# Patient Record
Sex: FEMALE | Race: WHITE | NOT HISPANIC OR LATINO
[De-identification: names, ages, dates, MRNs, and addresses within clinical notes are randomized per-mention and may not be internally consistent; named-entity substitution may affect disease eponyms.]

---

## 2022-04-22 ENCOUNTER — APPOINTMENT (OUTPATIENT)
Dept: ORTHOPEDIC SURGERY | Facility: CLINIC | Age: 67
End: 2022-04-22
Payer: MEDICARE

## 2022-04-22 VITALS — HEIGHT: 66 IN | BODY MASS INDEX: 28.61 KG/M2 | WEIGHT: 178 LBS

## 2022-04-22 PROCEDURE — 72040 X-RAY EXAM NECK SPINE 2-3 VW: CPT

## 2022-04-22 PROCEDURE — 99214 OFFICE O/P EST MOD 30 MIN: CPT

## 2022-04-22 RX ORDER — OXYCODONE HYDROCHLORIDE AND ACETAMINOPHEN 5; 325 MG/1; MG/1
5-325 TABLET ORAL
Refills: 0 | Status: ACTIVE | COMMUNITY

## 2022-04-22 RX ORDER — ATENOLOL 100 MG/1
100 TABLET ORAL
Refills: 0 | Status: ACTIVE | COMMUNITY

## 2022-04-22 RX ORDER — ASPIRIN 81 MG
81 TABLET, DELAYED RELEASE (ENTERIC COATED) ORAL
Refills: 0 | Status: ACTIVE | COMMUNITY

## 2022-04-22 RX ORDER — LOSARTAN POTASSIUM 100 MG/1
100 TABLET, FILM COATED ORAL
Refills: 0 | Status: ACTIVE | COMMUNITY

## 2022-04-22 RX ORDER — GABAPENTIN 300 MG/1
300 CAPSULE ORAL
Refills: 0 | Status: ACTIVE | COMMUNITY

## 2022-04-22 RX ORDER — AMLODIPINE BESYLATE 5 MG/1
5 TABLET ORAL
Refills: 0 | Status: ACTIVE | COMMUNITY

## 2022-04-22 RX ORDER — POLYVINYL ALCOHOL, POVIDONE 14; 6 MG/ML; MG/ML
SOLUTION/ DROPS OPHTHALMIC
Refills: 0 | Status: ACTIVE | COMMUNITY

## 2022-04-22 RX ORDER — MECLIZINE HCL 25 MG/1
25 TABLET ORAL
Refills: 0 | Status: ACTIVE | COMMUNITY

## 2022-04-22 RX ORDER — RABEPRAZOLE SODIUM 20 MG/1
20 TABLET, DELAYED RELEASE ORAL
Refills: 0 | Status: ACTIVE | COMMUNITY

## 2022-04-22 RX ORDER — CHOLECALCIFEROL (VITAMIN D3) 50 MCG
50 MCG TABLET ORAL
Refills: 0 | Status: ACTIVE | COMMUNITY

## 2022-04-22 NOTE — DATA REVIEWED
[Outside X-rays] : outside x-rays [Cervical Spine] : cervical spine [Report was reviewed and noted in the chart] : The report was reviewed and noted in the chart [I independently reviewed and interpreted images and report] : I independently reviewed and interpreted images and report [FreeTextEntry1] : I stop paperwork reviewed

## 2022-04-22 NOTE — PHYSICAL EXAM
[Normal Coordination] : normal coordination [Normal DTR UE/LE] : normal DTR UE/LE  [Normal Sensation] : normal sensation [Normal Mood and Affect] : normal mood and affect [Orientated] : orientated [Able to Communicate] : able to communicate [Normal Skin] : normal skin [No Rash] : no rash [No Ulcers] : no ulcers [No Lesions] : no lesions [No obvious lymphadenopathy in areas examined] : no obvious lymphadenopathy in areas examined [Well Developed] : well developed [Well Nourished] : well nourished [Peripheral vascular exam is grossly normal] : peripheral vascular exam is grossly normal [No Respiratory Distress] : no respiratory distress [Lungs clear to auscultation bilaterally] : lungs clear to auscultation bilaterally [NL (80)] : right lateral rotation 80 degrees [Biceps 2+] : biceps 2+ [Triceps 2+] : triceps 2+ [Brachioradialis 2+] : brachioradialis 2+ [NL (90)] : forward flexion 90 degrees [NL (30)] : right lateral rotation 30 degrees [NL (45)] : extension 45 degrees [NL (40)] : right lateral bending 40 degrees [Flexion] : flexion [Extension] : extension [5___] : right extensor hallicus longus 5[unfilled]/5 [] : non-antalgic

## 2022-04-22 NOTE — DISCUSSION/SUMMARY
[Medication Risks Reviewed] : Medication risks reviewed [de-identified] : Reviewed and discussed results of cervical spine x-ray. Patient referred for cervical MRI scan with and without contrast to evaluate persistent neck pain radiating into upper extremity despite medical intervention. Evaluate for disc herniation vs stenosis. DIscussed proper body mechanics and modified physical activity to avoid aggravation of symptoms. Patient will follow up after MRI scan. PAtient will continue home exercise rehabilitation for low back. Continue judicious use prn nsaid. Gabapentin renewed.

## 2022-04-22 NOTE — HISTORY OF PRESENT ILLNESS
[Neck] : neck [Lower back] : lower back [6] : 6 [5] : 5 [Retired] : Work status: retired [de-identified] : Patient states the severity of her pain has reduced since her previous visit. No new complaints. Patient still has intermittent moments of discomfort. Patient also complains of neck pain that radiates into upper extremities.  [de-identified] : p/t

## 2022-05-11 ENCOUNTER — RESULT REVIEW (OUTPATIENT)
Age: 67
End: 2022-05-11

## 2022-05-25 ENCOUNTER — APPOINTMENT (OUTPATIENT)
Dept: ORTHOPEDIC SURGERY | Facility: CLINIC | Age: 67
End: 2022-05-25
Payer: MEDICARE

## 2022-06-03 ENCOUNTER — APPOINTMENT (OUTPATIENT)
Dept: ORTHOPEDIC SURGERY | Facility: CLINIC | Age: 67
End: 2022-06-03
Payer: MEDICARE

## 2022-06-03 VITALS — HEIGHT: 66 IN | WEIGHT: 178 LBS | BODY MASS INDEX: 28.61 KG/M2

## 2022-06-03 DIAGNOSIS — Z87.19 PERSONAL HISTORY OF OTHER DISEASES OF THE DIGESTIVE SYSTEM: ICD-10-CM

## 2022-06-03 DIAGNOSIS — Z56.0 UNEMPLOYMENT, UNSPECIFIED: ICD-10-CM

## 2022-06-03 DIAGNOSIS — E78.00 PURE HYPERCHOLESTEROLEMIA, UNSPECIFIED: ICD-10-CM

## 2022-06-03 DIAGNOSIS — Z87.39 PERSONAL HISTORY OF OTHER DISEASES OF THE MUSCULOSKELETAL SYSTEM AND CONNECTIVE TISSUE: ICD-10-CM

## 2022-06-03 DIAGNOSIS — I10 ESSENTIAL (PRIMARY) HYPERTENSION: ICD-10-CM

## 2022-06-03 DIAGNOSIS — Z78.9 OTHER SPECIFIED HEALTH STATUS: ICD-10-CM

## 2022-06-03 PROCEDURE — 72100 X-RAY EXAM L-S SPINE 2/3 VWS: CPT

## 2022-06-03 PROCEDURE — 99214 OFFICE O/P EST MOD 30 MIN: CPT

## 2022-06-03 SDOH — ECONOMIC STABILITY - INCOME SECURITY: UNEMPLOYMENT, UNSPECIFIED: Z56.0

## 2022-06-07 PROBLEM — Z56.0 NOT CURRENTLY WORKING DUE TO DISABLED STATUS: Status: ACTIVE | Noted: 2022-06-03

## 2022-06-07 PROBLEM — Z87.39 HISTORY OF RHEUMATOID ARTHRITIS: Status: RESOLVED | Noted: 2022-06-03 | Resolved: 2022-06-07

## 2022-06-07 PROBLEM — Z87.19 HISTORY OF GASTROESOPHAGEAL REFLUX (GERD): Status: RESOLVED | Noted: 2022-06-03 | Resolved: 2022-06-07

## 2022-06-07 NOTE — DATA REVIEWED
[MRI] : MRI [Cervical Spine] : cervical spine [Report was reviewed and noted in the chart] : The report was reviewed and noted in the chart [Lumbar Spine] : lumbar spine [I independently reviewed and interpreted images and report] : I independently reviewed and interpreted images and report [I reviewed the films/CD and additionally noted] : I reviewed the films/CD and additionally noted [FreeTextEntry2] : In office xrays taken today demonstrate good maintenance of alignment and implant placement, progress to fusion. [FreeTextEntry1] : I stop paperwork reviewed

## 2022-06-07 NOTE — HISTORY OF PRESENT ILLNESS
[de-identified] : Patient states the severity of her pain has reduced since her previous visit. No new complaints. Patient states she still has neck stiffness with intermittent headaches. No changes in upper extremity strength b/l. Treatment with occipital blocks in the past for previous complaints did not provide relief. No numbness/tingling sensation to upper extremities b/l. No pain radiating into upper extremities b/l. Patient has continued with at home exercises/stretches as best tolerated. Patient also complains of SI joint pain. Patient has continued with walking as best tolerated. Patient states her lower back pain intermittently radiates into RT lower extremity. No changes in lower extremity strength b/l. No numbness/tingling sensation to lower extremities b/l.

## 2022-06-07 NOTE — PHYSICAL EXAM
[Normal Coordination] : normal coordination [Normal DTR UE/LE] : normal DTR UE/LE  [Normal Sensation] : normal sensation [Normal Mood and Affect] : normal mood and affect [Orientated] : orientated [Able to Communicate] : able to communicate [Normal Skin] : normal skin [No Rash] : no rash [No Ulcers] : no ulcers [No Lesions] : no lesions [No obvious lymphadenopathy in areas examined] : no obvious lymphadenopathy in areas examined [Well Developed] : well developed [Well Nourished] : well nourished [Peripheral vascular exam is grossly normal] : peripheral vascular exam is grossly normal [No Respiratory Distress] : no respiratory distress [Lungs clear to auscultation bilaterally] : lungs clear to auscultation bilaterally [NL (80)] : right lateral rotation 80 degrees [Biceps 2+] : biceps 2+ [Triceps 2+] : triceps 2+ [Brachioradialis 2+] : brachioradialis 2+ [NL (90)] : forward flexion 90 degrees [NL (30)] : right lateral rotation 30 degrees [NL (45)] : extension 45 degrees [NL (40)] : right lateral bending 40 degrees [5___] : right extensor hallicus longus 5[unfilled]/5 [Implants in position] : Implants in position [No loosening of hardware] : No loosening of hardware [] : non-antalgic [FreeTextEntry1] : Good maintenance of alignment and implant placement. Good progress towards fusion.

## 2022-06-07 NOTE — REASON FOR VISIT
[FreeTextEntry2] : [facility] Cervical Spine\par Low back pain, right side

## 2022-06-07 NOTE — DISCUSSION/SUMMARY
[Medication Risks Reviewed] : Medication risks reviewed [de-identified] : Reviewed and discussed results of cervical spine MRI scan. Discussed proper body mechanics and modified physical activity to avoid aggravation of symptoms. Patient will continue with at home exercises/stretches as best tolerated. Discussed limitations of benefits with discectomy surgery. Discussed further treatment with acupuncture therapy. Patient will use OTC NSAIDs as needed for symptom relief. Patient will follow up in 2 months. Reviewed and discussed results of lumbar spine x-ray. Patient will begin treatment with formal physical therapy for neck and piriformis muscle stretching.

## 2022-09-14 ENCOUNTER — APPOINTMENT (OUTPATIENT)
Dept: ORTHOPEDIC SURGERY | Facility: CLINIC | Age: 67
End: 2022-09-14

## 2022-09-14 VITALS — HEIGHT: 66 IN | WEIGHT: 178 LBS | BODY MASS INDEX: 28.61 KG/M2

## 2022-09-14 PROCEDURE — 72100 X-RAY EXAM L-S SPINE 2/3 VWS: CPT

## 2022-09-14 PROCEDURE — 99214 OFFICE O/P EST MOD 30 MIN: CPT

## 2022-09-14 RX ORDER — GABAPENTIN 300 MG/1
300 CAPSULE ORAL TWICE DAILY
Qty: 60 | Refills: 2 | Status: ACTIVE | COMMUNITY
Start: 2022-09-14 | End: 1900-01-01

## 2022-09-15 NOTE — PHYSICAL EXAM
[Normal Coordination] : normal coordination [Normal DTR UE/LE] : normal DTR UE/LE  [Normal Sensation] : normal sensation [Normal Mood and Affect] : normal mood and affect [Orientated] : orientated [Able to Communicate] : able to communicate [Normal Skin] : normal skin [No Rash] : no rash [No Ulcers] : no ulcers [No Lesions] : no lesions [No obvious lymphadenopathy in areas examined] : no obvious lymphadenopathy in areas examined [Well Developed] : well developed [Well Nourished] : well nourished [Peripheral vascular exam is grossly normal] : peripheral vascular exam is grossly normal [No Respiratory Distress] : no respiratory distress [Lungs clear to auscultation bilaterally] : lungs clear to auscultation bilaterally [NL (90)] : forward flexion 90 degrees [NL (30)] : right lateral rotation 30 degrees [NL (45)] : extension 45 degrees [NL (40)] : right lateral bending 40 degrees [Flexion] : flexion [Extension] : extension [5___] : right extensor hallicus longus 5[unfilled]/5 [Implants in position] : Implants in position [No loosening of hardware] : No loosening of hardware [] : non-antalgic [FreeTextEntry1] : Good maintenance of alignment and implant placement. Apparent solid fusion L4-S1

## 2022-09-15 NOTE — HISTORY OF PRESENT ILLNESS
[de-identified] : 66 y/o female presents for a f/u of lumbar. C/o Rt sided low back pain radiating into the buttock and lower extremity. Reports right leg spasms. Denies left sided symptoms. She is taking gabapentin as prescribed, which she finds helpful. Improvement of her symptoms from PT - she is in PT for her RT hip. No changes in lower extremity strength b/l. No numbness/tingling sensation to lower extremities b/l. Exacerbation of back pain with repetitive bending. \par \par \par \par

## 2022-09-15 NOTE — DISCUSSION/SUMMARY
[Medication Risks Reviewed] : Medication risks reviewed [de-identified] : Discussed and reviewed results of lumbar x-ray - implants in position, no loosening of hardware, apparent solid fusion L4-S1. Discussed conservative treatments in the form of anti-inflammatories, core strengthening exercises, and PT. Incorporate lumbar and lower extremity exercises / stretches into formal PT - Script given. Patient given prescription for gabapentin. Titration schedule provided. Advised patient on proper body mechanics to avoid aggravation of her symptoms. Follow up in 3 months. If leg symptoms persist consider mri to r/o adjacent segment stenosis.\par \stephany SORTO Acting as a Scribe for Adela Love, attest that this documentation has been prepared under the direction and in the presence of Provider Arturo Ibarra MD.

## 2022-10-11 ENCOUNTER — APPOINTMENT (OUTPATIENT)
Dept: ORTHOPEDIC SURGERY | Facility: CLINIC | Age: 67
End: 2022-10-11
Payer: MEDICARE

## 2022-10-11 VITALS — BODY MASS INDEX: 28.61 KG/M2 | WEIGHT: 178 LBS | HEIGHT: 66 IN

## 2022-10-11 DIAGNOSIS — G57.31 LESION OF LATERAL POPLITEAL NERVE, RIGHT LOWER LIMB: ICD-10-CM

## 2022-10-11 PROCEDURE — 99204 OFFICE O/P NEW MOD 45 MIN: CPT

## 2022-10-11 PROCEDURE — 99214 OFFICE O/P EST MOD 30 MIN: CPT

## 2022-10-11 PROCEDURE — 95864 NEEDLE EMG 4 EXTREMITIES: CPT

## 2022-10-11 PROCEDURE — 73562 X-RAY EXAM OF KNEE 3: CPT | Mod: RT

## 2022-10-11 NOTE — REASON FOR VISIT
[FreeTextEntry2] : here today for f/u right knee TKA 2008.  states she is going to PT for hip/back and is getting popping, pain and swelling in right knee.

## 2022-10-11 NOTE — DISCUSSION/SUMMARY
[de-identified] : patient advised to participate in physical therapy and home exercises\par \par patient sent for an EMG to evaluate peroneal neuropathy \par

## 2022-10-11 NOTE — PHYSICAL EXAM
[5___] : hamstring 5[unfilled]/5 [Right] : right knee [AP] : anteroposterior [Lateral] : lateral [Lyle] : skyline [There are no fractures, subluxations or dislocations. No significant abnormalities are seen] : There are no fractures, subluxations or dislocations. No significant abnormalities are seen [Components well fixed, in good position] : Components well fixed, in good position [] : no deformities of patellar tendon [FreeTextEntry8] : Biceps Femoris to fiula head  [FreeTextEntry9] : Can push to extension  [de-identified] : Decreased sensation Peroneal Nerve  [TWNoteComboBox7] : flexion 90 degrees [de-identified] : extension 5 degrees

## 2022-11-22 ENCOUNTER — APPOINTMENT (OUTPATIENT)
Dept: ORTHOPEDIC SURGERY | Facility: CLINIC | Age: 67
End: 2022-11-22

## 2022-12-14 ENCOUNTER — APPOINTMENT (OUTPATIENT)
Dept: ORTHOPEDIC SURGERY | Facility: CLINIC | Age: 67
End: 2022-12-14

## 2022-12-14 VITALS — HEIGHT: 66 IN | BODY MASS INDEX: 28.61 KG/M2 | WEIGHT: 178 LBS

## 2022-12-14 PROCEDURE — 99214 OFFICE O/P EST MOD 30 MIN: CPT

## 2022-12-14 PROCEDURE — 72100 X-RAY EXAM L-S SPINE 2/3 VWS: CPT

## 2022-12-14 RX ORDER — GABAPENTIN 300 MG/1
300 CAPSULE ORAL TWICE DAILY
Qty: 60 | Refills: 2 | Status: ACTIVE | COMMUNITY
Start: 2022-12-14 | End: 1900-01-01

## 2022-12-15 NOTE — HISTORY OF PRESENT ILLNESS
[de-identified] : 12/14/2022 - The patient is a 67 year female  who presents today follow up low back, left side pain in buttocks and thigh. in the lower buttocks to lower extremities in the left leg. When bending, back starts locking up. 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 Patient reports that surgery improved pain. SI joint is still in pain, mainly right side.Taking Gabapentin twice a day. Hospitalized a month ago, CT scan, blockage in the small intense, states she is doing better. \par \par Date of Injury/Onset: few days after picking up a bin.\par Pain:   At Rest: 9/10 \par With Activity:  7/10 \par Taking gabapentin at night\par \par 09/14/2022 - 66 y/o female presents for a f/u of lumbar. C/o Rt sided low back pain radiating into the buttock and lower extremity. Reports right leg spasms. Denies left sided symptoms. She is taking gabapentin as prescribed, which she finds helpful. Improvement of her symptoms from PT - she is in PT for her RT hip. No changes in lower extremity strength b/l. No numbness/tingling sensation to lower extremities b/l. Exacerbation of back pain with repetitive bending. \par \par \par \par

## 2022-12-15 NOTE — DATA REVIEWED
[Lumbar Spine] : lumbar spine [I independently reviewed and interpreted images and report] : I independently reviewed and interpreted images and report [I reviewed the films/CD and additionally noted] : I reviewed the films/CD and additionally noted [FreeTextEntry1] : I stop paperwork reviewed\par Rheumatology progress notes reviewed

## 2022-12-15 NOTE — DISCUSSION/SUMMARY
[Medication Risks Reviewed] : Medication risks reviewed [de-identified] : Discussed and reviewed results of lumbar x-ray - implants in position, no loosening of hardware, apparent solid fusion L4-S1. Discussed conservative treatments in the form of anti-inflammatories, core strengthening exercises, and Gabapentin. Incorporate lumbar and lower extremity exercises / stretches into routine. Patient given renewal for gabapentin. Titration schedule provided. Advised patient on proper body mechanics to avoid aggravation of her symptoms. If leg symptoms persist consider mri to r/o adjacent segment stenosis. Follow up in 2-3 months. \par \stephany SORTO Acting as a Scribe for Adela Love, attest that this documentation has been prepared under the direction and in the presence of Provider Arturo Ibarra MD.

## 2023-04-05 ENCOUNTER — APPOINTMENT (OUTPATIENT)
Dept: ORTHOPEDIC SURGERY | Facility: CLINIC | Age: 68
End: 2023-04-05
Payer: MEDICARE

## 2023-04-05 VITALS — WEIGHT: 178 LBS | HEIGHT: 66 IN | BODY MASS INDEX: 28.61 KG/M2

## 2023-04-05 PROCEDURE — 99214 OFFICE O/P EST MOD 30 MIN: CPT

## 2023-04-05 PROCEDURE — 72040 X-RAY EXAM NECK SPINE 2-3 VW: CPT

## 2023-04-05 PROCEDURE — 72100 X-RAY EXAM L-S SPINE 2/3 VWS: CPT

## 2023-04-05 RX ORDER — GABAPENTIN 300 MG/1
300 CAPSULE ORAL 3 TIMES DAILY
Qty: 90 | Refills: 2 | Status: ACTIVE | COMMUNITY
Start: 2023-04-05 | End: 1900-01-01

## 2023-04-11 NOTE — HISTORY OF PRESENT ILLNESS
[de-identified] : 4/5/23- 66 y/o female presenting with lower back pain with radicular sxs into the right buttock. RT hand procedure 3/10, she is recovering well and completing PT for the hand. Difficulty with gait due to RT sided focal low back pain radiating into the buttock. \par Pain at rest:5/10\par Pain with activity: 8/10\par Treatment/changes/ images since last visit: pt reports sxs improved since last visit and flared up again about 1 month ago, has been taking gabapentin\par *Cervical - C/o left sided neck pain radiating into the left upper arm (severity improved since initial onset after treating with heat and TENS unit). Weakness in arm due to pain vs neurologic deficit. \par \par 12/14/2022 - The patient is a 67 year female  who presents today follow up low back, left side pain in buttocks and thigh. in the lower buttocks to lower extremities in the left leg. When bending, back starts locking up. 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 Patient reports that surgery improved pain. SI joint is still in pain, mainly right side.Taking Gabapentin twice a day. Hospitalized a month ago, CT scan, blockage in the small intense, states she is doing better. \par \par Date of Injury/Onset: few days after picking up a bin.\par Pain:   At Rest: 9/10 \par With Activity:  7/10 \par Taking gabapentin at night\par \par 09/14/2022 - 66 y/o female presents for a f/u of lumbar. C/o Rt sided low back pain radiating into the buttock and lower extremity. Reports right leg spasms. Denies left sided symptoms. She is taking gabapentin as prescribed, which she finds helpful. Improvement of her symptoms from PT - she is in PT for her RT hip. No changes in lower extremity strength b/l. No numbness/tingling sensation to lower extremities b/l. Exacerbation of back pain with repetitive bending. \par \par \par \par

## 2023-04-11 NOTE — DISCUSSION/SUMMARY
[Medication Risks Reviewed] : Medication risks reviewed [de-identified] : Discussed and reviewed results of lumbar x-ray - implants in position, no loosening of hardware, apparent solid fusion L4-S1. Discussed conservative treatments in the form of anti-inflammatories, core strengthening exercises, and Gabapentin. Incorporate lumbar and lower extremity exercises / stretches into routine. C/t treatment with gabapentin as prescribed. Advised patient on proper body mechanics to avoid aggravation of her symptoms. If leg symptoms persist consider mri to r/o adjacent segment stenosis. Patient provided with Pt script according to protocol. Follow up in 2-3 months. \par \par Prior to appointment and during encounter with patient extensive medical records were reviewed including but not limited to, hospital records, outpatient records, imaging results, and lab data.During this appointment the patient was examined, diagnoses were discussed and explained in a face to face manner. In addition extensive time was spent reviewing aforementioned diagnostic studies. Counseling including abnormal image results, differential diagnoses, treatment options, risk and benefits, lifestyle changes, current condition, and current medications was performed. Patient's comments, questions, and concerns were addressed and patient verbalized understanding. Based on this patient's presentation at our office, which is an orthopedic spine surgeon's office, this patient inherently / intrinsically has a risk, however minute, of developing issues such as Cauda equina syndrome, bowel and bladder changes, or progression of motor or neurological deficits such as paralysis which may be permanent. \par \par ADELA SORTO Acting as a Scribe for Adela Love, attest that this documentation has been prepared under the direction and in the presence of Provider Arturo Ibarra MD.

## 2023-07-12 ENCOUNTER — APPOINTMENT (OUTPATIENT)
Dept: ORTHOPEDIC SURGERY | Facility: CLINIC | Age: 68
End: 2023-07-12
Payer: MEDICARE

## 2023-07-12 VITALS — BODY MASS INDEX: 28.61 KG/M2 | WEIGHT: 178 LBS | HEIGHT: 66 IN

## 2023-07-12 PROCEDURE — 99213 OFFICE O/P EST LOW 20 MIN: CPT

## 2023-07-31 ENCOUNTER — RESULT REVIEW (OUTPATIENT)
Age: 68
End: 2023-07-31

## 2023-08-02 ENCOUNTER — APPOINTMENT (OUTPATIENT)
Dept: ORTHOPEDIC SURGERY | Facility: CLINIC | Age: 68
End: 2023-08-02
Payer: MEDICARE

## 2023-08-02 VITALS — WEIGHT: 178 LBS | BODY MASS INDEX: 28.61 KG/M2 | HEIGHT: 66 IN

## 2023-08-02 PROCEDURE — 99213 OFFICE O/P EST LOW 20 MIN: CPT

## 2023-08-06 NOTE — HISTORY OF PRESENT ILLNESS
[de-identified] : 08/02/2023: Patient presenting for an MRI Review of Cervical Spine. Severeity of pain reduced over the last week. Chief complaint of left sided neck pain and intermittent radiculopathy.   07/12/2023 - 67 y/o F presenting for an evaluation of cervical spine. Chief complaint of left sided neck pain and radiculopathy, subjective weakness in the left arm (chronic issue). Secondary complaints of right sided neck pain. Last C spine MRI May 2022. Pain is worse compared to 1 year ago. Pain is breaking through Advil and Gabapentin. Completing sessions of physical therapy for cervical spine without any change in symptoms, d/c. Using ice and heat for symptom control. PAin is severe at nighttime with certain positional movements.

## 2023-08-06 NOTE — DISCUSSION/SUMMARY
[de-identified] : MRI reveals no sgincant nerve compression. No spinal cord compression. Degenerative changes throughout.   I advised the patient that I do not anticipate the findings on MRI to result in surgery.  There is limitation of treatment with nsaids due to GI upset.  Patient was provided with a referral for cervical physical therapy to work on stretching, strengthening and range of motion. FUV 6 WKS  Prior to appointment and during encounter with patient extensive medical records were reviewed including but not limited to, hospital records, outpatient records, imaging results, and lab data.During this appointment the patient was examined, diagnoses were discussed and explained in a face to face manner. In addition extensive time was spent reviewing aforementioned diagnostic studies. Counseling including abnormal image results, differential diagnoses, treatment options, risk and benefits, lifestyle changes, current condition, and current medications was performed. Patient's comments, questions, and concerns were addressed and patient verbalized understanding. Based on this patient's presentation at our office, which is an orthopedic spine surgeon's office, this patient inherently / intrinsically has a risk, however minute, of developing issues such as Cauda equina syndrome, bowel and bladder changes, or progression of motor or neurological deficits such as paralysis which may be permanent.  ADELA SIMON Acting as a Scribe for Dr. Fred BACK, Adela Simon, attest that this documentation has been prepared under the direction and in the presence of Provider Arturo Ibarra MD.

## 2023-08-06 NOTE — PHYSICAL EXAM
[Normal Coordination] : normal coordination [Normal DTR UE/LE] : normal DTR UE/LE  [Normal Sensation] : normal sensation [Normal Mood and Affect] : normal mood and affect [Orientated] : orientated [Able to Communicate] : able to communicate [Normal Skin] : normal skin [No Rash] : no rash [No Ulcers] : no ulcers [No Lesions] : no lesions [No obvious lymphadenopathy in areas examined] : no obvious lymphadenopathy in areas examined [Well Developed] : well developed [Well Nourished] : well nourished [Peripheral vascular exam is grossly normal] : peripheral vascular exam is grossly normal [No Respiratory Distress] : no respiratory distress [Lungs clear to auscultation bilaterally] : lungs clear to auscultation bilaterally [Normal Bowel Sounds] : normal bowel sounds [Non-Tender] : non-tender [No HSM] : no HSM [No Mass] : no mass [Biceps 2+] : biceps 2+ [Triceps 2+] : triceps 2+ [Brachioradialis 2+] : brachioradialis 2+ [] : negative Pritchett reflex

## 2023-08-20 NOTE — DISCUSSION/SUMMARY
[de-identified] : I am requesting a cervical spine MRI to evaluate for stenosis vs hnp, patient experiencing persistent left sided neck and upper extremity radic refrac to Advil, Gabapentin, ice, heat, physical therapy, last MRI study is approx 14 months old. She may be a candidate for interventional pain management in terms of epidural steroid injections although this will be determined upon review of the MRI. F/U after the study is complete. \par \par Prior to appointment and during encounter with patient extensive medical records were reviewed including but not limited to, hospital records, outpatient records, imaging results, and lab data.During this appointment the patient was examined, diagnoses were discussed and explained in a face to face manner. In addition extensive time was spent reviewing aforementioned diagnostic studies. Counseling including abnormal image results, differential diagnoses, treatment options, risk and benefits, lifestyle changes, current condition, and current medications was performed. Patient's comments, questions, and concerns were addressed and patient verbalized understanding. Based on this patient's presentation at our office, which is an orthopedic spine surgeon's office, this patient inherently / intrinsically has a risk, however minute, of developing issues such as Cauda equina syndrome, bowel and bladder changes, or progression of motor or neurological deficits such as paralysis which may be permanent.\par \par ADELA SIMON Acting as a Scribe for Dr. Ibarra\stephany I, Adela Simon, attest that this documentation has been prepared under the direction and in the presence of Provider Arturo Ibarra MD. \par \par

## 2023-08-20 NOTE — HISTORY OF PRESENT ILLNESS
[de-identified] : 07/12/2023 - 69 y/o F presenting for an evaluation of cervical spine. Chief complaint of left sided neck pain and radiculopathy, subjective weakness in the left arm (chronic issue). Secondary complaints of right sided neck pain. Last C spine MRI May 2022. Pain is worse compared to 1 year ago. Pain is breaking through Advil and Gabapentin. Completing sessions of physical therapy for cervical spine without any change in symptoms, d/c. Using ice and heat for symptom control. PAin is severe at nighttime with certain positional movements. \par \par \par \par \par

## 2023-09-08 ENCOUNTER — APPOINTMENT (OUTPATIENT)
Dept: ORTHOPEDIC SURGERY | Facility: CLINIC | Age: 68
End: 2023-09-08
Payer: MEDICARE

## 2023-09-08 VITALS — BODY MASS INDEX: 28.61 KG/M2 | WEIGHT: 178 LBS | HEIGHT: 66 IN

## 2023-09-08 PROCEDURE — 99214 OFFICE O/P EST MOD 30 MIN: CPT

## 2023-09-08 PROCEDURE — 72100 X-RAY EXAM L-S SPINE 2/3 VWS: CPT

## 2023-09-08 RX ORDER — METHYLPREDNISOLONE 4 MG/1
4 TABLET ORAL
Qty: 1 | Refills: 0 | Status: ACTIVE | COMMUNITY
Start: 2023-09-08 | End: 1900-01-01

## 2023-09-10 NOTE — HISTORY OF PRESENT ILLNESS
[9] : 9 [6] : 6 [Retired] : Work status: retired [de-identified] : 09/08/2023 - Patient presenting for an evulation of neck and low back. She complains of severe back pain radiating into the left buttock/hip, extending into the left quad x 2 weeks without trauma or mechanism of injury. She has been treating with heat, ice , Tylenol, topicals - pain is breaking through. States side effects from muscle relaxers. She is taking gabapentin which mildly reduces the severity of her pain. Neck pain has been stable. She is in physical therapy for neck only.   [de-identified] : p/t- neck only [FreeTextEntry5] : lower back pain started about 2 weeks ago & pain is sever with walking, getting out of bed & movement. neck pain is a little better with p/t

## 2023-09-10 NOTE — DISCUSSION/SUMMARY
[de-identified] : XRAY taken today of lumbar spine today is normal.  I advised the patient to continue her current PT trial directed at cervical, and to begin implementing the exercises they're teaching her into a daily routine.  I am prescribing the patient MDP for pain relief. Titration schedule provided.  She will keep her holding appointment scheduled 9/27/23.   Prior to appointment and during encounter with patient extensive medical records were reviewed including but not limited to, hospital records, outpatient records, imaging results, and lab data.During this appointment the patient was examined, diagnoses were discussed and explained in a face to face manner. In addition extensive time was spent reviewing aforementioned diagnostic studies. Counseling including abnormal image results, differential diagnoses, treatment options, risk and benefits, lifestyle changes, current condition, and current medications was performed. Patient's comments, questions, and concerns were addressed and patient verbalized understanding. Based on this patient's presentation at our office, which is an orthopedic spine surgeon's office, this patient inherently / intrinsically has a risk, however minute, of developing issues such as Cauda equina syndrome, bowel and bladder changes, or progression of motor or neurological deficits such as paralysis which may be permanent.  ADELA SORTO Acting as a Scribe for Adela Lizarraga, attest that this documentation has been prepared under the direction and in the presence of Provider Arturo Ibarra MD.   ADELA SORTO Acting as a Scribe for Adela Lizarraga, attest that this documentation has been prepared under the direction and in the presence of Provider Arturo Ibarra MD.

## 2023-09-10 NOTE — PHYSICAL EXAM
[Normal Coordination] : normal coordination [Normal DTR UE/LE] : normal DTR UE/LE  [Normal Sensation] : normal sensation [Normal Mood and Affect] : normal mood and affect [Orientated] : orientated [Able to Communicate] : able to communicate [Normal Skin] : normal skin [No Rash] : no rash [No Ulcers] : no ulcers [No Lesions] : no lesions [No obvious lymphadenopathy in areas examined] : no obvious lymphadenopathy in areas examined [Well Developed] : well developed [Peripheral vascular exam is grossly normal] : peripheral vascular exam is grossly normal [No Respiratory Distress] : no respiratory distress [Lungs clear to auscultation bilaterally] : lungs clear to auscultation bilaterally [Normal Bowel Sounds] : normal bowel sounds [Non-Tender] : non-tender [No HSM] : no HSM [No Mass] : no mass [Biceps 2+] : biceps 2+ [Triceps 2+] : triceps 2+ [Brachioradialis 2+] : brachioradialis 2+ [Flexion] : flexion [Extension] : extension [] : negative Pritchett reflex

## 2023-09-10 NOTE — DATA REVIEWED
[I independently reviewed and interpreted images and report] : I independently reviewed and interpreted images and report [I reviewed the films/CD and additionally noted] : I reviewed the films/CD and additionally noted [FreeTextEntry1] : I stop paperwork reviewed PT progress notes reviewed

## 2023-09-27 ENCOUNTER — APPOINTMENT (OUTPATIENT)
Dept: ORTHOPEDIC SURGERY | Facility: CLINIC | Age: 68
End: 2023-09-27
Payer: MEDICARE

## 2023-09-27 VITALS — WEIGHT: 178 LBS | BODY MASS INDEX: 28.61 KG/M2 | HEIGHT: 66 IN

## 2023-09-27 PROCEDURE — 99214 OFFICE O/P EST MOD 30 MIN: CPT | Mod: 25

## 2023-09-27 PROCEDURE — 20552 NJX 1/MLT TRIGGER POINT 1/2: CPT

## 2023-10-31 ENCOUNTER — APPOINTMENT (OUTPATIENT)
Dept: ORTHOPEDIC SURGERY | Facility: CLINIC | Age: 68
End: 2023-10-31
Payer: MEDICARE

## 2023-10-31 VITALS — BODY MASS INDEX: 28.61 KG/M2 | HEIGHT: 66 IN | WEIGHT: 178 LBS

## 2023-10-31 DIAGNOSIS — Z98.890 OTHER SPECIFIED POSTPROCEDURAL STATES: ICD-10-CM

## 2023-10-31 DIAGNOSIS — M76.52 PATELLAR TENDINITIS, LEFT KNEE: ICD-10-CM

## 2023-10-31 PROCEDURE — 99213 OFFICE O/P EST LOW 20 MIN: CPT

## 2023-10-31 PROCEDURE — 73562 X-RAY EXAM OF KNEE 3: CPT | Mod: FY,LT

## 2023-10-31 RX ORDER — MELOXICAM 15 MG/1
15 TABLET ORAL
Qty: 14 | Refills: 0 | Status: ACTIVE | COMMUNITY
Start: 2023-10-31 | End: 1900-01-01

## 2023-11-15 ENCOUNTER — APPOINTMENT (OUTPATIENT)
Dept: ORTHOPEDIC SURGERY | Facility: CLINIC | Age: 68
End: 2023-11-15

## 2023-12-06 ENCOUNTER — APPOINTMENT (OUTPATIENT)
Dept: ORTHOPEDIC SURGERY | Facility: CLINIC | Age: 68
End: 2023-12-06
Payer: MEDICARE

## 2023-12-06 VITALS — HEIGHT: 66 IN | WEIGHT: 178 LBS | BODY MASS INDEX: 28.61 KG/M2

## 2023-12-06 DIAGNOSIS — S90.31XA CONTUSION OF RIGHT FOOT, INITIAL ENCOUNTER: ICD-10-CM

## 2023-12-06 PROCEDURE — 73620 X-RAY EXAM OF FOOT: CPT | Mod: RT

## 2023-12-06 PROCEDURE — 99214 OFFICE O/P EST MOD 30 MIN: CPT

## 2023-12-06 RX ORDER — GABAPENTIN 300 MG/1
300 CAPSULE ORAL TWICE DAILY
Qty: 60 | Refills: 2 | Status: ACTIVE | COMMUNITY
Start: 2023-12-06 | End: 1900-01-01

## 2024-01-31 ENCOUNTER — APPOINTMENT (OUTPATIENT)
Dept: ORTHOPEDIC SURGERY | Facility: CLINIC | Age: 69
End: 2024-01-31
Payer: MEDICARE

## 2024-01-31 VITALS — WEIGHT: 178 LBS | BODY MASS INDEX: 28.61 KG/M2 | HEIGHT: 66 IN

## 2024-01-31 DIAGNOSIS — M54.12 RADICULOPATHY, CERVICAL REGION: ICD-10-CM

## 2024-01-31 PROCEDURE — 99214 OFFICE O/P EST MOD 30 MIN: CPT

## 2024-01-31 RX ORDER — GABAPENTIN 300 MG/1
300 CAPSULE ORAL 3 TIMES DAILY
Qty: 90 | Refills: 3 | Status: ACTIVE | COMMUNITY
Start: 2024-01-31 | End: 1900-01-01

## 2024-02-04 NOTE — HISTORY OF PRESENT ILLNESS
[9] : 9 [6] : 6 [Retired] : Work status: retired [de-identified] : 01/31/2024 - The patient is here for a follow-up, reporting no significant changes since the last visit. She continues to experience low back aching, which intensifies at nighttime and during sleep. To manage the pain radiating into her leg, she is currently undergoing treatment with Gabapentin three times a day. Currently not enrolled in PT.   12/06/2023 - Patient presenting in follow up. She reports dropping a brick on her right foot 3 days ago. She complains of right foot pain and difficulty walking as a result. She also reports aggravated pain radiating into her b/l legs lately, worse with repetitive bending. She is currently enrolled in PT twice per week which is helpful.   09/27/2023 - Patient presenting for a follow up with chief complaint of left sided low back pain radiating into left buttock/pelvic region. No radiating leg pain. Treating with muscle relaxer and Advil without any real relief. Enrolled in PT for neck which was helpful, would like to transition into PT for low back.   09/08/2023 - Patient presenting for an evulation of neck and low back. She complains of severe back pain radiating into the left buttock/hip, extending into the left quad x 2 weeks without trauma or mechanism of injury. She has been treating with heat, ice , Tylenol, topicals - pain is breaking through. States side effects from muscle relaxers. She is taking gabapentin which mildly reduces the severity of her pain. Neck pain has been stable. She is in physical therapy for neck only.   [FreeTextEntry5] : lower back pain started about 2 weeks ago & pain is sever with walking, getting out of bed & movement. neck pain is a little better with p/t [de-identified] : p/t- neck only

## 2024-02-04 NOTE — DISCUSSION/SUMMARY
[de-identified] : Patient was advised to work on stretching, strengthening and range of motion. Discussed continued medical mgmt with Gabapentin as prescribed (renewed). Follow up in 2-3 months.   We have discussed the risks, benefits, and alternatives NSAID therapy including but not limited to the risk of bleeding, thrombosis, gastric mucosal irritation/ulceration, allergic reaction and kidney dysfunction; the patient verbalizes an understanding.   Prior to appointment and during encounter with patient extensive medical records were reviewed including but not limited to, hospital records, outpatient records, imaging results, and lab data.During this appointment the patient was examined, diagnoses were discussed and explained in a face to face manner. In addition extensive time was spent reviewing aforementioned diagnostic studies. Counseling including abnormal image results, differential diagnoses, treatment options, risk and benefits, lifestyle changes, current condition, and current medications was performed. Patient's comments, questions, and concerns were addressed and patient verbalized understanding. Based on this patient's presentation at our office, which is an orthopedic spine surgeon's office, this patient inherently / intrinsically has a risk, however minute, of developing issues such as Cauda equina syndrome, bowel and bladder changes, or progression of motor or neurological deficits such as paralysis which may be permanent.  ADELA SIMON Acting as a Scribe for Dr. Fred BACK, Adela Simon, attest that this documentation has been prepared under the direction and in the presence of Provider Arturo Ibarra MD.

## 2024-02-04 NOTE — DATA REVIEWED
[Outside X-rays] : outside x-rays [Lumbar Spine] : lumbar spine [I independently reviewed and interpreted images and report] : I independently reviewed and interpreted images and report [FreeTextEntry1] : I stop paperwork reviewe

## 2024-02-16 ENCOUNTER — APPOINTMENT (OUTPATIENT)
Dept: ORTHOPEDIC SURGERY | Facility: CLINIC | Age: 69
End: 2024-02-16
Payer: MEDICARE

## 2024-02-16 VITALS — WEIGHT: 179 LBS | HEIGHT: 66 IN | BODY MASS INDEX: 28.77 KG/M2

## 2024-02-16 DIAGNOSIS — M62.830 MUSCLE SPASM OF BACK: ICD-10-CM

## 2024-02-16 PROCEDURE — 99214 OFFICE O/P EST MOD 30 MIN: CPT

## 2024-02-16 RX ORDER — TRAMADOL HYDROCHLORIDE 50 MG/1
50 TABLET, COATED ORAL EVERY 6 HOURS
Qty: 60 | Refills: 0 | Status: ACTIVE | COMMUNITY
Start: 2024-02-16 | End: 1900-01-01

## 2024-02-18 NOTE — HISTORY OF PRESENT ILLNESS
[9] : 9 [6] : 6 [Retired] : Work status: retired [FreeTextEntry5] : lower back pain started about 2 weeks ago & pain is sever with walking, getting out of bed & movement. neck pain is a little better with p/t [de-identified] : 02/16/2024 - Patient presenting for follow-up with complaints of left-sided low back pain. She reports the onset of left-sided back pain starting two weeks ago, with an intensification of pain a few days ago. No trauma. She describes the pain as radiating into the left hip but not extending into the leg. She has tried multiple different NSAIDs, muscle relaxers, and a heating pad without significant improvement. The pain limits her ability to stand and sit comfortably.  01/31/2024 - The patient is here for a follow-up, reporting no significant changes since the last visit. She continues to experience low back aching, which intensifies at nighttime and during sleep. To manage the pain radiating into her leg, she is currently undergoing treatment with Gabapentin three times a day. Currently not enrolled in PT.   12/06/2023 - Patient presenting in follow up. She reports dropping a brick on her right foot 3 days ago. She complains of right foot pain and difficulty walking as a result. She also reports aggravated pain radiating into her b/l legs lately, worse with repetitive bending. She is currently enrolled in PT twice per week which is helpful.   09/27/2023 - Patient presenting for a follow up with chief complaint of left sided low back pain radiating into left buttock/pelvic region. No radiating leg pain. Treating with muscle relaxer and Advil without any real relief. Enrolled in PT for neck which was helpful, would like to transition into PT for low back.   09/08/2023 - Patient presenting for an evulation of neck and low back. She complains of severe back pain radiating into the left buttock/hip, extending into the left quad x 2 weeks without trauma or mechanism of injury. She has been treating with heat, ice , Tylenol, topicals - pain is breaking through. States side effects from muscle relaxers. She is taking gabapentin which mildly reduces the severity of her pain. Neck pain has been stable. She is in physical therapy for neck only.   [de-identified] : p/t- neck only

## 2024-02-18 NOTE — PHYSICAL EXAM
[Normal Coordination] : normal coordination [Normal DTR UE/LE] : normal DTR UE/LE  [Normal Sensation] : normal sensation [Normal Mood and Affect] : normal mood and affect [Oriented] : oriented [Able to Communicate] : able to communicate [Normal Skin] : normal skin [No Rash] : no rash [No Ulcers] : no ulcers [No Lesions] : no lesions [No obvious lymphadenopathy in areas examined] : no obvious lymphadenopathy in areas examined [Well Developed] : well developed [Peripheral vascular exam is grossly normal] : peripheral vascular exam is grossly normal [No Respiratory Distress] : no respiratory distress [Flexion] : flexion [Extension] : extension [] : negative sitting straight leg raise

## 2024-02-18 NOTE — DATA REVIEWED
[Lumbar Spine] : lumbar spine [I independently reviewed and interpreted images and report] : I independently reviewed and interpreted images and report [FreeTextEntry1] : I stop paperwork reviewe

## 2024-02-18 NOTE — DISCUSSION/SUMMARY
[de-identified] : Patient advised to resume PT to work on stretching, strengthening and range of motion. Discussed continued medical mgmt with Gabapentin as prescribed. Declines TPI. Patient will utilize ice / heat prn. At patient request, I am requesting a lumbar spine MRI to r/o stenosis vs hnp, patient experiencing escalation in back pain refrac to NSAIDs, muscle relaxers, and a heating pad. FUV after MRI.   We have discussed the risks, benefits, and alternatives NSAID therapy including but not limited to the risk of bleeding, thrombosis, gastric mucosal irritation/ulceration, allergic reaction and kidney dysfunction; the patient verbalizes an understanding.   Prior to appointment and during encounter with patient extensive medical records were reviewed including but not limited to, hospital records, outpatient records, imaging results, and lab data.During this appointment the patient was examined, diagnoses were discussed and explained in a face to face manner. In addition extensive time was spent reviewing aforementioned diagnostic studies. Counseling including abnormal image results, differential diagnoses, treatment options, risk and benefits, lifestyle changes, current condition, and current medications was performed. Patient's comments, questions, and concerns were addressed and patient verbalized understanding. Based on this patient's presentation at our office, which is an orthopedic spine surgeon's office, this patient inherently / intrinsically has a risk, however minute, of developing issues such as Cauda equina syndrome, bowel and bladder changes, or progression of motor or neurological deficits such as paralysis which may be permanent.  ADELA SORTO Acting as a Scribe for Adela Lizarraga, attest that this documentation has been prepared under the direction and in the presence of Provider Arturo Ibarra MD.

## 2024-03-01 ENCOUNTER — RESULT REVIEW (OUTPATIENT)
Age: 69
End: 2024-03-01

## 2024-03-19 ENCOUNTER — APPOINTMENT (OUTPATIENT)
Dept: ORTHOPEDIC SURGERY | Facility: CLINIC | Age: 69
End: 2024-03-19
Payer: MEDICARE

## 2024-03-19 VITALS — BODY MASS INDEX: 0.78 KG/M2 | HEIGHT: 60 IN | WEIGHT: 4 LBS

## 2024-03-19 DIAGNOSIS — S76.311A STRAIN OF MUSCLE, FASCIA AND TENDON OF THE POSTERIOR MUSCLE GROUP AT THIGH LEVEL, RIGHT THIGH, INITIAL ENCOUNTER: ICD-10-CM

## 2024-03-19 PROCEDURE — 73562 X-RAY EXAM OF KNEE 3: CPT | Mod: RT

## 2024-03-19 PROCEDURE — 99214 OFFICE O/P EST MOD 30 MIN: CPT

## 2024-03-19 PROCEDURE — 99204 OFFICE O/P NEW MOD 45 MIN: CPT

## 2024-03-19 NOTE — PHYSICAL EXAM
[5___] : hamstring 5[unfilled]/5 [All Views] : anteroposterior, lateral, skyline, and anteroposterior standing [] : patient ambulates without assistive device [Right] : right knee [FreeTextEntry8] : LATERAL GASTROC TENDER   [Components well fixed, in good position] : Components well fixed, in good position [FreeTextEntry9] : SCREWS FIXED  [de-identified] : extension 0 degrees [TWNoteComboBox7] : flexion 95 degrees

## 2024-03-19 NOTE — HISTORY OF PRESENT ILLNESS
[de-identified] : Date of Injury/Onset:     Right TKA  2008 Left TKA 2009 C/O Right Knee Posterior pain  she is going to PT for  Spine  issues  Fusion 2021and Knee pain is getting in the way of her PT for her Back   Pain: At Rest: 2/10   With Activity: 7/10 Affecting Sleep:   Y  Difficulty with stairs:  Y  Difficulty getting in and out of car: Y  Sit to stand stiffness: Y  Affects walking short/long distances?  Y  Home/Work/Recreation effected?   Y  Mechanism of injury: NKI     Quality of symptoms: LOCK S  Up and then more painful POSTERIORLY WHEN FLEXING AND TWISTING FEELS BETTER WHEN STRAINGHTENING KNEE Improves with:     Nothing   PT DID NOT HELP THE CATCHING   OTC Medicines:  Advil  Aleve NO HELP RX medicines:no  Heat, Ice, Elevation: CSI or Gel Injections:  (Indicate which) Physical Therapy/ HEP:    yes current

## 2024-03-19 NOTE — DISCUSSION/SUMMARY
[de-identified] : Patient was given Rx for MRI of the right knee to further evaluate for ligamentous, muscle and/or cartilaginous injury that is suspected due to physical examination. Patient also reports history of clicking/popping sensations, decreased ROM and strength, and a feeling of instability associated with this body part. Patient was instructed to f/u after imaging for review.

## 2024-04-02 ENCOUNTER — RESULT REVIEW (OUTPATIENT)
Age: 69
End: 2024-04-02

## 2024-04-16 ENCOUNTER — APPOINTMENT (OUTPATIENT)
Dept: ORTHOPEDIC SURGERY | Facility: CLINIC | Age: 69
End: 2024-04-16
Payer: MEDICARE

## 2024-04-16 VITALS — WEIGHT: 178 LBS | BODY MASS INDEX: 34.95 KG/M2 | HEIGHT: 60 IN

## 2024-04-16 DIAGNOSIS — Z96.651 PRESENCE OF RIGHT ARTIFICIAL KNEE JOINT: ICD-10-CM

## 2024-04-16 PROCEDURE — 99213 OFFICE O/P EST LOW 20 MIN: CPT

## 2024-04-16 NOTE — PHYSICAL EXAM
[Right] : right knee [NL (0)] : extension 0 degrees [5___] : hamstring 5[unfilled]/5 [] : patient ambulates without assistive device [TWNoteComboBox7] : flexion 90 degrees

## 2024-04-16 NOTE — DATA REVIEWED
[MRI] : MRI [Right] : of the right [Knee] : knee [Report was reviewed and noted in the chart] : The report was reviewed and noted in the chart [I reviewed the films/CD and agree] : I reviewed the films/CD and agree [FreeTextEntry1] : Status post total right knee arthroplasty. Mild osteolysis adjacent to the medial aspect of the tibial component.  Small knee joint effusion. The presence of superimposed infection cannot be completely excluded.

## 2024-04-16 NOTE — HISTORY OF PRESENT ILLNESS
[de-identified] : Date of Injury/Onset:     Right TKA  2008 Left TKA 2009 C/O Right Knee Posterior pain  she is going to PT for  Spine  issues  Fusion 2021and Knee pain is getting in the way of her PT for her Back   Pain: At Rest: 2/10   With Activity: 7/10 Affecting Sleep:   Y  Difficulty with stairs:  Y  Difficulty getting in and out of car: Y  Sit to stand stiffness: Y  Affects walking short/long distances?  Y  Home/Work/Recreation effected?   Y  Mechanism of injury: NKI     Quality of symptoms: LOCK S  Up and then more painful POSTERIORLY WHEN FLEXING AND TWISTING FEELS BETTER WHEN STRAINGHTENING KNEE Improves with:     Nothing   PT DID NOT HELP THE CATCHING   OTC Medicines:  Advil  Aleve NO HELP RX medicines:no  Heat, Ice, Elevation: CSI or Gel Injections:  (Indicate which) Physical Therapy/ HEP:    yes current

## 2024-04-16 NOTE — REASON FOR VISIT
[FreeTextEntry2] : HERE TODAY FOR F/U MRI RIGHT KNEE.  C/O CONTINUED SAME PAIN AS LAST VISIT.  Right TKA  2008 Left TKA 2009

## 2024-04-16 NOTE — DISCUSSION/SUMMARY
[de-identified] : S/P RIGHT TKA  Patient is progressing well at this time. Upon inspection of the incision, the area was clean, dry and there were no signs of infection. Discussed the importance of gaining and maintaining good ROM and its involvement in daily life. Patient will procced with PT at this time Following discussion of the options the plan at this time is for the patient to go forward with organized therapy. Patient was provided with a Rx for PT at this time. Patient expressed understanding the treatment plan and will begin PT as soon as they can.  f/u 3 years

## 2024-05-01 ENCOUNTER — APPOINTMENT (OUTPATIENT)
Dept: ORTHOPEDIC SURGERY | Facility: CLINIC | Age: 69
End: 2024-05-01
Payer: MEDICARE

## 2024-05-01 VITALS — BODY MASS INDEX: 34.95 KG/M2 | HEIGHT: 60 IN | WEIGHT: 178 LBS

## 2024-05-01 PROCEDURE — 99214 OFFICE O/P EST MOD 30 MIN: CPT

## 2024-05-01 RX ORDER — GABAPENTIN 300 MG/1
300 CAPSULE ORAL 3 TIMES DAILY
Qty: 90 | Refills: 1 | Status: ACTIVE | COMMUNITY
Start: 2024-05-01 | End: 1900-01-01

## 2024-05-02 NOTE — DISCUSSION/SUMMARY
[de-identified] : Patient advised to continue current PT to work on stretching, strengthening and range of motion (renewed). Discussed continued medical mgmt with Gabapentin as prescribed, renewed medication. Patient will utilize ice / heat prn. Follow up in 2-3 months.   We have discussed the risks, benefits, and alternatives NSAID therapy including but not limited to the risk of bleeding, thrombosis, gastric mucosal irritation/ulceration, allergic reaction and kidney dysfunction; the patient verbalizes an understanding.   Prior to appointment and during encounter with patient extensive medical records were reviewed including but not limited to, hospital records, outpatient records, imaging results, and lab data.During this appointment the patient was examined, diagnoses were discussed and explained in a face to face manner. In addition extensive time was spent reviewing aforementioned diagnostic studies. Counseling including abnormal image results, differential diagnoses, treatment options, risk and benefits, lifestyle changes, current condition, and current medications was performed. Patient's comments, questions, and concerns were addressed and patient verbalized understanding. Based on this patient's presentation at our office, which is an orthopedic spine surgeon's office, this patient inherently / intrinsically has a risk, however minute, of developing issues such as Cauda equina syndrome, bowel and bladder changes, or progression of motor or neurological deficits such as paralysis which may be permanent.  ADELA SIMON Acting as a Scribe for Dr. Fred BACK, Adela Simon, attest that this documentation has been prepared under the direction and in the presence of Provider Arturo Ibarra MD.

## 2024-05-02 NOTE — HISTORY OF PRESENT ILLNESS
[9] : 9 [6] : 6 [Retired] : Work status: retired [de-identified] : 05/01/2024 - Patient presenting in follow up, c/o left buttock pain radiating into her lateral hip. She has been enrolled in physical therapy for her lumbar and knee which is reported as overall helpful. Pain worsens while lying on her left side specifically at nighttime. Reports overall heightened severity of pain after performing house work yesterday. Taking Gabapentin TID for symptom control.   02/16/2024 - Patient presenting for follow-up with complaints of left-sided low back pain. She reports the onset of left-sided back pain starting two weeks ago, with an intensification of pain a few days ago. No trauma. She describes the pain as radiating into the left hip but not extending into the leg. She has tried multiple different NSAIDs, muscle relaxers, and a heating pad without significant improvement. The pain limits her ability to stand and sit comfortably.  01/31/2024 - The patient is here for a follow-up, reporting no significant changes since the last visit. She continues to experience low back aching, which intensifies at nighttime and during sleep. To manage the pain radiating into her leg, she is currently undergoing treatment with Gabapentin three times a day. Currently not enrolled in PT.   12/06/2023 - Patient presenting in follow up. She reports dropping a brick on her right foot 3 days ago. She complains of right foot pain and difficulty walking as a result. She also reports aggravated pain radiating into her b/l legs lately, worse with repetitive bending. She is currently enrolled in PT twice per week which is helpful.   09/27/2023 - Patient presenting for a follow up with chief complaint of left sided low back pain radiating into left buttock/pelvic region. No radiating leg pain. Treating with muscle relaxer and Advil without any real relief. Enrolled in PT for neck which was helpful, would like to transition into PT for low back.   09/08/2023 - Patient presenting for an evulation of neck and low back. She complains of severe back pain radiating into the left buttock/hip, extending into the left quad x 2 weeks without trauma or mechanism of injury. She has been treating with heat, ice , Tylenol, topicals - pain is breaking through. States side effects from muscle relaxers. She is taking gabapentin which mildly reduces the severity of her pain. Neck pain has been stable. She is in physical therapy for neck only.   [FreeTextEntry5] : lower back pain started about 2 weeks ago & pain is sever with walking, getting out of bed & movement. neck pain is a little better with p/t [de-identified] : p/t- neck only

## 2024-06-12 ENCOUNTER — APPOINTMENT (OUTPATIENT)
Dept: ORTHOPEDIC SURGERY | Facility: CLINIC | Age: 69
End: 2024-06-12
Payer: MEDICARE

## 2024-06-12 VITALS — WEIGHT: 178 LBS | HEIGHT: 60 IN | BODY MASS INDEX: 34.95 KG/M2

## 2024-06-12 DIAGNOSIS — M51.26 OTHER INTERVERTEBRAL DISC DISPLACEMENT, LUMBAR REGION: ICD-10-CM

## 2024-06-12 PROCEDURE — 72100 X-RAY EXAM L-S SPINE 2/3 VWS: CPT

## 2024-06-12 PROCEDURE — 99214 OFFICE O/P EST MOD 30 MIN: CPT

## 2024-06-12 RX ORDER — OXYCODONE AND ACETAMINOPHEN 5; 325 MG/1; MG/1
5-325 TABLET ORAL AT BEDTIME
Qty: 30 | Refills: 0 | Status: ACTIVE | COMMUNITY
Start: 2024-06-12 | End: 1900-01-01

## 2024-06-16 NOTE — HISTORY OF PRESENT ILLNESS
[9] : 9 [6] : 6 [Retired] : Work status: retired [de-identified] : 05/01/2024 - Patient presenting in follow up, c/o left buttock pain radiating into her lateral hip. She has been enrolled in physical therapy for her lumbar and knee which is reported as overall helpful. Pain worsens while lying on her left side specifically at nighttime. Reports overall heightened severity of pain after performing house work yesterday. Taking Gabapentin TID for symptom control.   02/16/2024 - Patient presenting for follow-up with complaints of left-sided low back pain. She reports the onset of left-sided back pain starting two weeks ago, with an intensification of pain a few days ago. No trauma. She describes the pain as radiating into the left hip but not extending into the leg. She has tried multiple different NSAIDs, muscle relaxers, and a heating pad without significant improvement. The pain limits her ability to stand and sit comfortably.  01/31/2024 - The patient is here for a follow-up, reporting no significant changes since the last visit. She continues to experience low back aching, which intensifies at nighttime and during sleep. To manage the pain radiating into her leg, she is currently undergoing treatment with Gabapentin three times a day. Currently not enrolled in PT.   12/06/2023 - Patient presenting in follow up. She reports dropping a brick on her right foot 3 days ago. She complains of right foot pain and difficulty walking as a result. She also reports aggravated pain radiating into her b/l legs lately, worse with repetitive bending. She is currently enrolled in PT twice per week which is helpful.   09/27/2023 - Patient presenting for a follow up with chief complaint of left sided low back pain radiating into left buttock/pelvic region. No radiating leg pain. Treating with muscle relaxer and Advil without any real relief. Enrolled in PT for neck which was helpful, would like to transition into PT for low back.   09/08/2023 - Patient presenting for an evulation of neck and low back. She complains of severe back pain radiating into the left buttock/hip, extending into the left quad x 2 weeks without trauma or mechanism of injury. She has been treating with heat, ice , Tylenol, topicals - pain is breaking through. States side effects from muscle relaxers. She is taking gabapentin which mildly reduces the severity of her pain. Neck pain has been stable. She is in physical therapy for neck only.   [FreeTextEntry5] : lower back pain started about 2 weeks ago & pain is sever with walking, getting out of bed & movement. neck pain is a little better with p/t [de-identified] : p/t- neck only

## 2024-06-16 NOTE — DISCUSSION/SUMMARY
[de-identified] : Patient advised to continue current PT to work on stretching, strengthening and range of motion (renewed). Discussed continued medical mgmt with Gabapentin as prescribed, renewed medication. Patient will utilize ice / heat prn. Follow up in 2-3 months.   We have discussed the risks, benefits, and alternatives NSAID therapy including but not limited to the risk of bleeding, thrombosis, gastric mucosal irritation/ulceration, allergic reaction and kidney dysfunction; the patient verbalizes an understanding.   Prior to appointment and during encounter with patient extensive medical records were reviewed including but not limited to, hospital records, outpatient records, imaging results, and lab data.During this appointment the patient was examined, diagnoses were discussed and explained in a face to face manner. In addition extensive time was spent reviewing aforementioned diagnostic studies. Counseling including abnormal image results, differential diagnoses, treatment options, risk and benefits, lifestyle changes, current condition, and current medications was performed. Patient's comments, questions, and concerns were addressed and patient verbalized understanding. Based on this patient's presentation at our office, which is an orthopedic spine surgeon's office, this patient inherently / intrinsically has a risk, however minute, of developing issues such as Cauda equina syndrome, bowel and bladder changes, or progression of motor or neurological deficits such as paralysis which may be permanent.  ADELA SIMON Acting as a Scribe for Dr. Fred BACK, Adela Simon, attest that this documentation has been prepared under the direction and in the presence of Provider Arturo Ibarra MD.

## 2024-07-12 ENCOUNTER — APPOINTMENT (OUTPATIENT)
Dept: ORTHOPEDIC SURGERY | Facility: CLINIC | Age: 69
End: 2024-07-12
Payer: MEDICARE

## 2024-07-12 VITALS — HEIGHT: 60 IN | WEIGHT: 178 LBS | BODY MASS INDEX: 34.95 KG/M2

## 2024-07-12 DIAGNOSIS — M51.26 OTHER INTERVERTEBRAL DISC DISPLACEMENT, LUMBAR REGION: ICD-10-CM

## 2024-07-12 PROCEDURE — 99213 OFFICE O/P EST LOW 20 MIN: CPT

## 2024-07-12 RX ORDER — ALPRAZOLAM 2 MG/1
TABLET ORAL
Refills: 0 | Status: ACTIVE | COMMUNITY

## 2024-07-12 RX ORDER — VORTIOXETINE 5 MG/1
5 TABLET, FILM COATED ORAL
Refills: 0 | Status: ACTIVE | COMMUNITY

## 2024-09-11 ENCOUNTER — APPOINTMENT (OUTPATIENT)
Dept: ORTHOPEDIC SURGERY | Facility: CLINIC | Age: 69
End: 2024-09-11
Payer: MEDICARE

## 2024-09-11 DIAGNOSIS — M51.26 OTHER INTERVERTEBRAL DISC DISPLACEMENT, LUMBAR REGION: ICD-10-CM

## 2024-09-11 PROCEDURE — 99213 OFFICE O/P EST LOW 20 MIN: CPT

## 2024-09-18 NOTE — HISTORY OF PRESENT ILLNESS
[de-identified] : 09/11/2024 - Pt presenting for follow up visit, c/o thoracolumbar pain, right hip, groin, and leg pain. Weening off Gabapentin, taking qhs. Planning to totally ween off. Started antidepressant 10 wks ago - notes positive change in mood. unsure if weening down gabapentin too fast attributed to onset of depression/anxiety. She stopped PT due to increase in right hip pain.   07/12/2024: Patient presenting today for a FUV. She reports depression and anxiety after fully stopping Gabapentin for 3 days. Weening off Gabapentin, 300mg every other day.  She reports BL UE & LE paresthesia's. Began Xanax 2.5mg.   05/01/2024 - Patient presenting in follow up, c/o left buttock pain radiating into her lateral hip. She has been enrolled in physical therapy for her lumbar and knee which is reported as overall helpful. Pain worsens while lying on her left side specifically at nighttime. Reports overall heightened severity of pain after performing house work yesterday. Taking Gabapentin TID for symptom control.   02/16/2024 - Patient presenting for follow-up with complaints of left-sided low back pain. She reports the onset of left-sided back pain starting two weeks ago, with an intensification of pain a few days ago. No trauma. She describes the pain as radiating into the left hip but not extending into the leg. She has tried multiple different NSAIDs, muscle relaxers, and a heating pad without significant improvement. The pain limits her ability to stand and sit comfortably.  01/31/2024 - The patient is here for a follow-up, reporting no significant changes since the last visit. She continues to experience low back aching, which intensifies at nighttime and during sleep. To manage the pain radiating into her leg, she is currently undergoing treatment with Gabapentin three times a day. Currently not enrolled in PT.   12/06/2023 - Patient presenting in follow up. She reports dropping a brick on her right foot 3 days ago. She complains of right foot pain and difficulty walking as a result. She also reports aggravated pain radiating into her b/l legs lately, worse with repetitive bending. She is currently enrolled in PT twice per week which is helpful.   09/27/2023 - Patient presenting for a follow up with chief complaint of left sided low back pain radiating into left buttock/pelvic region. No radiating leg pain. Treating with muscle relaxer and Advil without any real relief. Enrolled in PT for neck which was helpful, would like to transition into PT for low back.   09/08/2023 - Patient presenting for an evulation of neck and low back. She complains of severe back pain radiating into the left buttock/hip, extending into the left quad x 2 weeks without trauma or mechanism of injury. She has been treating with heat, ice , Tylenol, topicals - pain is breaking through. States side effects from muscle relaxers. She is taking gabapentin which mildly reduces the severity of her pain. Neck pain has been stable. She is in physical therapy for neck only.

## 2024-09-18 NOTE — DISCUSSION/SUMMARY
[de-identified] : Patient provided with renewal for PT to work on stretching, strengthening and range of motion. Patient will utilize ice / heat prn. Discussed with pt proper way to ween off Gabapentin. Continue to follow up with PCP per depression & anxiety and adjustment of SSRIs if needed. She will follow up with Dr. Morton for right hip pain. Follow up in 6 weeks.   We have discussed the risks, benefits, and alternatives NSAID therapy including but not limited to the risk of bleeding, thrombosis, gastric mucosal irritation/ulceration, allergic reaction and kidney dysfunction; the patient verbalizes an understanding.   Prior to appointment and during encounter with patient extensive medical records were reviewed including but not limited to, hospital records, outpatient records, imaging results, and lab data.During this appointment the patient was examined, diagnoses were discussed and explained in a face to face manner. In addition extensive time was spent reviewing aforementioned diagnostic studies. Counseling including abnormal image results, differential diagnoses, treatment options, risk and benefits, lifestyle changes, current condition, and current medications was performed. Patient's comments, questions, and concerns were addressed and patient verbalized understanding. Based on this patient's presentation at our office, which is an orthopedic spine surgeon's office, this patient inherently / intrinsically has a risk, however minute, of developing issues such as Cauda equina syndrome, bowel and bladder changes, or progression of motor or neurological deficits such as paralysis which may be permanent.  ADELA SIMON Acting as a Scribe for Dr. Fred BACK, Adela Simon, attest that this documentation has been prepared under the direction and in the presence of Provider Arturo Ibarra MD.

## 2024-09-18 NOTE — DISCUSSION/SUMMARY
[de-identified] : Patient provided with renewal for PT to work on stretching, strengthening and range of motion. Patient will utilize ice / heat prn. Discussed with pt proper way to ween off Gabapentin. Continue to follow up with PCP per depression & anxiety and adjustment of SSRIs if needed. She will follow up with Dr. Morton for right hip pain. Follow up in 6 weeks.   We have discussed the risks, benefits, and alternatives NSAID therapy including but not limited to the risk of bleeding, thrombosis, gastric mucosal irritation/ulceration, allergic reaction and kidney dysfunction; the patient verbalizes an understanding.   Prior to appointment and during encounter with patient extensive medical records were reviewed including but not limited to, hospital records, outpatient records, imaging results, and lab data.During this appointment the patient was examined, diagnoses were discussed and explained in a face to face manner. In addition extensive time was spent reviewing aforementioned diagnostic studies. Counseling including abnormal image results, differential diagnoses, treatment options, risk and benefits, lifestyle changes, current condition, and current medications was performed. Patient's comments, questions, and concerns were addressed and patient verbalized understanding. Based on this patient's presentation at our office, which is an orthopedic spine surgeon's office, this patient inherently / intrinsically has a risk, however minute, of developing issues such as Cauda equina syndrome, bowel and bladder changes, or progression of motor or neurological deficits such as paralysis which may be permanent.  ADELA SIMON Acting as a Scribe for Dr. Fred BACK, Adela Simon, attest that this documentation has been prepared under the direction and in the presence of Provider Arturo Ibarra MD.

## 2024-09-18 NOTE — HISTORY OF PRESENT ILLNESS
[de-identified] : 09/11/2024 - Pt presenting for follow up visit, c/o thoracolumbar pain, right hip, groin, and leg pain. Weening off Gabapentin, taking qhs. Planning to totally ween off. Started antidepressant 10 wks ago - notes positive change in mood. unsure if weening down gabapentin too fast attributed to onset of depression/anxiety. She stopped PT due to increase in right hip pain.   07/12/2024: Patient presenting today for a FUV. She reports depression and anxiety after fully stopping Gabapentin for 3 days. Weening off Gabapentin, 300mg every other day.  She reports BL UE & LE paresthesia's. Began Xanax 2.5mg.   05/01/2024 - Patient presenting in follow up, c/o left buttock pain radiating into her lateral hip. She has been enrolled in physical therapy for her lumbar and knee which is reported as overall helpful. Pain worsens while lying on her left side specifically at nighttime. Reports overall heightened severity of pain after performing house work yesterday. Taking Gabapentin TID for symptom control.   02/16/2024 - Patient presenting for follow-up with complaints of left-sided low back pain. She reports the onset of left-sided back pain starting two weeks ago, with an intensification of pain a few days ago. No trauma. She describes the pain as radiating into the left hip but not extending into the leg. She has tried multiple different NSAIDs, muscle relaxers, and a heating pad without significant improvement. The pain limits her ability to stand and sit comfortably.  01/31/2024 - The patient is here for a follow-up, reporting no significant changes since the last visit. She continues to experience low back aching, which intensifies at nighttime and during sleep. To manage the pain radiating into her leg, she is currently undergoing treatment with Gabapentin three times a day. Currently not enrolled in PT.   12/06/2023 - Patient presenting in follow up. She reports dropping a brick on her right foot 3 days ago. She complains of right foot pain and difficulty walking as a result. She also reports aggravated pain radiating into her b/l legs lately, worse with repetitive bending. She is currently enrolled in PT twice per week which is helpful.   09/27/2023 - Patient presenting for a follow up with chief complaint of left sided low back pain radiating into left buttock/pelvic region. No radiating leg pain. Treating with muscle relaxer and Advil without any real relief. Enrolled in PT for neck which was helpful, would like to transition into PT for low back.   09/08/2023 - Patient presenting for an evulation of neck and low back. She complains of severe back pain radiating into the left buttock/hip, extending into the left quad x 2 weeks without trauma or mechanism of injury. She has been treating with heat, ice , Tylenol, topicals - pain is breaking through. States side effects from muscle relaxers. She is taking gabapentin which mildly reduces the severity of her pain. Neck pain has been stable. She is in physical therapy for neck only.

## 2024-10-23 ENCOUNTER — APPOINTMENT (OUTPATIENT)
Dept: ORTHOPEDIC SURGERY | Facility: CLINIC | Age: 69
End: 2024-10-23

## 2024-11-12 ENCOUNTER — APPOINTMENT (OUTPATIENT)
Dept: ORTHOPEDIC SURGERY | Facility: CLINIC | Age: 69
End: 2024-11-12
Payer: MEDICARE

## 2024-11-12 VITALS — WEIGHT: 175 LBS | BODY MASS INDEX: 28.12 KG/M2 | HEIGHT: 66 IN

## 2024-11-12 DIAGNOSIS — Z87.09 PERSONAL HISTORY OF OTHER DISEASES OF THE RESPIRATORY SYSTEM: ICD-10-CM

## 2024-11-12 DIAGNOSIS — S76.311A STRAIN OF MUSCLE, FASCIA AND TENDON OF THE POSTERIOR MUSCLE GROUP AT THIGH LEVEL, RIGHT THIGH, INITIAL ENCOUNTER: ICD-10-CM

## 2024-11-12 DIAGNOSIS — G89.29 PAIN IN RIGHT HIP: ICD-10-CM

## 2024-11-12 DIAGNOSIS — M25.551 PAIN IN RIGHT HIP: ICD-10-CM

## 2024-11-12 DIAGNOSIS — S76.011A STRAIN OF MUSCLE, FASCIA AND TENDON OF RIGHT HIP, INITIAL ENCOUNTER: ICD-10-CM

## 2024-11-12 DIAGNOSIS — Z88.6 ALLERGY STATUS TO ANALGESIC AGENT: ICD-10-CM

## 2024-11-12 DIAGNOSIS — M70.61 TROCHANTERIC BURSITIS, RIGHT HIP: ICD-10-CM

## 2024-11-12 DIAGNOSIS — S76.811A STRAIN OF OTHER SPECIFIED MUSCLES, FASCIA AND TENDONS AT THIGH LEVEL, RIGHT THIGH, INITIAL ENCOUNTER: ICD-10-CM

## 2024-11-12 DIAGNOSIS — Z87.19 PERSONAL HISTORY OF OTHER DISEASES OF THE DIGESTIVE SYSTEM: ICD-10-CM

## 2024-11-12 PROCEDURE — 99214 OFFICE O/P EST MOD 30 MIN: CPT

## 2024-11-12 PROCEDURE — 73502 X-RAY EXAM HIP UNI 2-3 VIEWS: CPT

## 2024-11-12 RX ORDER — ROSUVASTATIN CALCIUM 5 MG/1
5 TABLET, FILM COATED ORAL
Refills: 0 | Status: ACTIVE | COMMUNITY

## 2024-12-04 ENCOUNTER — APPOINTMENT (OUTPATIENT)
Dept: ORTHOPEDIC SURGERY | Facility: CLINIC | Age: 69
End: 2024-12-04
Payer: MEDICARE

## 2024-12-04 VITALS — BODY MASS INDEX: 28.12 KG/M2 | WEIGHT: 175 LBS | HEIGHT: 66 IN

## 2024-12-04 DIAGNOSIS — M48.061 SPINAL STENOSIS, LUMBAR REGION WITHOUT NEUROGENIC CLAUDICATION: ICD-10-CM

## 2024-12-04 DIAGNOSIS — M51.26 OTHER INTERVERTEBRAL DISC DISPLACEMENT, LUMBAR REGION: ICD-10-CM

## 2024-12-04 PROCEDURE — 99214 OFFICE O/P EST MOD 30 MIN: CPT

## 2024-12-05 PROBLEM — M48.061 SPINAL STENOSIS OF LUMBAR REGION WITHOUT NEUROGENIC CLAUDICATION: Status: ACTIVE | Noted: 2024-12-05

## 2025-01-15 ENCOUNTER — APPOINTMENT (OUTPATIENT)
Dept: ORTHOPEDIC SURGERY | Facility: CLINIC | Age: 70
End: 2025-01-15
Payer: MEDICARE

## 2025-01-15 VITALS — HEIGHT: 66 IN | WEIGHT: 175 LBS | BODY MASS INDEX: 28.12 KG/M2

## 2025-01-15 DIAGNOSIS — M51.26 OTHER INTERVERTEBRAL DISC DISPLACEMENT, LUMBAR REGION: ICD-10-CM

## 2025-01-15 PROCEDURE — 99214 OFFICE O/P EST MOD 30 MIN: CPT

## 2025-02-26 ENCOUNTER — APPOINTMENT (OUTPATIENT)
Dept: ORTHOPEDIC SURGERY | Facility: CLINIC | Age: 70
End: 2025-02-26
Payer: MEDICARE

## 2025-02-26 DIAGNOSIS — M48.061 SPINAL STENOSIS, LUMBAR REGION WITHOUT NEUROGENIC CLAUDICATION: ICD-10-CM

## 2025-02-26 PROCEDURE — 99213 OFFICE O/P EST LOW 20 MIN: CPT

## 2025-04-09 ENCOUNTER — APPOINTMENT (OUTPATIENT)
Dept: ORTHOPEDIC SURGERY | Facility: CLINIC | Age: 70
End: 2025-04-09

## 2025-05-30 ENCOUNTER — APPOINTMENT (OUTPATIENT)
Dept: ORTHOPEDIC SURGERY | Facility: CLINIC | Age: 70
End: 2025-05-30
Payer: MEDICARE

## 2025-05-30 DIAGNOSIS — M48.061 SPINAL STENOSIS, LUMBAR REGION WITHOUT NEUROGENIC CLAUDICATION: ICD-10-CM

## 2025-05-30 PROCEDURE — 99214 OFFICE O/P EST MOD 30 MIN: CPT
